# Patient Record
Sex: MALE | Race: WHITE | NOT HISPANIC OR LATINO | Employment: OTHER | URBAN - METROPOLITAN AREA
[De-identification: names, ages, dates, MRNs, and addresses within clinical notes are randomized per-mention and may not be internally consistent; named-entity substitution may affect disease eponyms.]

---

## 2018-08-21 ENCOUNTER — HISTORICAL (OUTPATIENT)
Dept: ADMINISTRATIVE | Facility: HOSPITAL | Age: 58
End: 2018-08-21

## 2018-08-21 LAB
INR PPP: 2.44 (ref 0.9–1.2)
PROTHROMBIN TIME: 25.2 SECOND(S) (ref 11.9–14.4)

## 2018-10-03 ENCOUNTER — HISTORICAL (OUTPATIENT)
Dept: RADIOLOGY | Facility: HOSPITAL | Age: 58
End: 2018-10-03

## 2018-11-20 ENCOUNTER — HISTORICAL (OUTPATIENT)
Dept: CARDIOLOGY | Facility: CLINIC | Age: 58
End: 2018-11-20

## 2018-11-20 LAB
ALBUMIN SERPL-MCNC: 3.6 GM/DL (ref 3.4–5)
ALBUMIN/GLOB SERPL: 1 RATIO (ref 1–2)
ALP SERPL-CCNC: 77 UNIT/L (ref 45–117)
ALT SERPL-CCNC: 42 UNIT/L (ref 12–78)
AST SERPL-CCNC: 19 UNIT/L (ref 15–37)
BILIRUB SERPL-MCNC: 0.4 MG/DL (ref 0.2–1)
BILIRUBIN DIRECT+TOT PNL SERPL-MCNC: 0.1 MG/DL
BILIRUBIN DIRECT+TOT PNL SERPL-MCNC: 0.3 MG/DL
BUN SERPL-MCNC: 10 MG/DL (ref 7–18)
CALCIUM SERPL-MCNC: 8.6 MG/DL (ref 8.5–10.1)
CHLORIDE SERPL-SCNC: 106 MMOL/L (ref 98–107)
CHOLEST SERPL-MCNC: 147 MG/DL
CHOLEST/HDLC SERPL: 5.4 {RATIO} (ref 0–5)
CO2 SERPL-SCNC: 30 MMOL/L (ref 21–32)
CREAT SERPL-MCNC: 0.9 MG/DL (ref 0.6–1.3)
GLOBULIN SER-MCNC: 4.3 GM/ML (ref 2.3–3.5)
GLUCOSE SERPL-MCNC: 217 MG/DL (ref 74–106)
HDLC SERPL-MCNC: 27 MG/DL
LDLC SERPL CALC-MCNC: 62 MG/DL (ref 0–130)
POTASSIUM SERPL-SCNC: 4.4 MMOL/L (ref 3.5–5.1)
PROT SERPL-MCNC: 7.9 GM/DL (ref 6.4–8.2)
SODIUM SERPL-SCNC: 140 MMOL/L (ref 136–145)
TRIGL SERPL-MCNC: 290 MG/DL
VLDLC SERPL CALC-MCNC: 58 MG/DL

## 2019-03-28 ENCOUNTER — HISTORICAL (OUTPATIENT)
Dept: CARDIOLOGY | Facility: CLINIC | Age: 59
End: 2019-03-28

## 2019-03-28 LAB
BUN SERPL-MCNC: 11 MG/DL (ref 7–18)
CALCIUM SERPL-MCNC: 8.9 MG/DL (ref 8.5–10.1)
CHLORIDE SERPL-SCNC: 103 MMOL/L (ref 98–107)
CO2 SERPL-SCNC: 31 MMOL/L (ref 21–32)
CREAT SERPL-MCNC: 0.8 MG/DL (ref 0.6–1.3)
CREAT/UREA NIT SERPL: 14
GLUCOSE SERPL-MCNC: 213 MG/DL (ref 74–106)
POTASSIUM SERPL-SCNC: 4.5 MMOL/L (ref 3.5–5.1)
SODIUM SERPL-SCNC: 139 MMOL/L (ref 136–145)

## 2020-04-28 ENCOUNTER — HISTORICAL (OUTPATIENT)
Dept: ADMINISTRATIVE | Facility: HOSPITAL | Age: 60
End: 2020-04-28

## 2020-04-28 LAB
INR PPP: 1.4 (ref 0.9–1.2)
PROTHROMBIN TIME: 17.1 SECOND(S) (ref 11.9–14.4)

## 2020-06-11 ENCOUNTER — HISTORICAL (OUTPATIENT)
Dept: LAB | Facility: HOSPITAL | Age: 60
End: 2020-06-11

## 2022-02-17 ENCOUNTER — HISTORICAL (OUTPATIENT)
Dept: RADIOLOGY | Facility: HOSPITAL | Age: 62
End: 2022-02-17

## 2022-02-17 ENCOUNTER — HISTORICAL (OUTPATIENT)
Dept: ADMINISTRATIVE | Facility: HOSPITAL | Age: 62
End: 2022-02-17

## 2022-02-17 LAB — CREAT SERPL-MCNC: 0.98 MG/DL (ref 0.73–1.18)

## 2022-04-10 ENCOUNTER — HISTORICAL (OUTPATIENT)
Dept: ADMINISTRATIVE | Facility: HOSPITAL | Age: 62
End: 2022-04-10

## 2022-04-26 VITALS
WEIGHT: 221.81 LBS | DIASTOLIC BLOOD PRESSURE: 82 MMHG | SYSTOLIC BLOOD PRESSURE: 130 MMHG | HEIGHT: 67 IN | OXYGEN SATURATION: 99 % | BODY MASS INDEX: 34.81 KG/M2

## 2022-05-03 NOTE — HISTORICAL [facility] CERNER
This is a historical note converted from Kristina. Formatting and pictures may have been removed.  Please reference Kristina for original formatting and attached multimedia. Chief Complaint  initial visit referred by [facility] for hx of DVT denies chest pain or sob thinks he is here to resume coumadin tx no questions today  History of Present Illness  58-year-old male referred by [facility] for further evaluation of hx of?DVT.? He has a past medical history of diabetes mellitus and hyperlipidemia.? He continues to take Coumadin with?his last INR in May 2018 (per patients report).? He denies chest pain, palpitations, orthopnea, PND, peripheral edema, syncope, or any newly noticed activity limitations.? He reports shortness of breath with exertion. ?He also reports claudication symptoms when ambulating approximately 1 block.? He had?abnormal PATRICK testing in June 2017.? He completed a nuclear stress test in March 2018 which was?a normal perfusion study with no perfusion defects noted.  ?  Nuclear stress test March 2018:  This is a normal perfusion study, no perfusion defects noted.  The scan is suggestive of low risk for future cardiovascular events.  The left ventricular cavity is noted to be normal on the stress study. ?The left ventricular ejection fraction was calculated to be 63% and left ventricular global function is normal.? Next on the study quality is good.  ?  Bilateral carotid ultrasound?March 2018:  The study quality is good.  1-39% stenosis in the proximal right internal carotid artery based on?Bluth criteria.  1-39% stenosis in the proximal left internal carotid artery based on Bluth criteria.  Less than 50% stenosis in the distal bilateral common carotid arteries with mild hyperechoic plaque  Antegrade right vertebral artery flow.? Antegrade left vertebral artery flow.  ?  Location the arterial ultrasound?June 2017:  The study quality is good.  50-75% stenosis detected in the right  distal superficial femoral artery.  Less than 30% stenosis detected in the left mid superficial femoral artery.  Less than 30% stenosis detected in the left popliteal artery.  Right common femoral artery and right superficial femoral artery appear patent with hemodynamically significant stenosis detected.  ?  Review of Systems  Constitutional: negative for fever,chills, sweats, weakness, fatigue, decreased activity?????  Eye: negative  ENMT: negative  Respiratory: GRAY  Cardiovascular: negative  Gastrointestinal: negative?for nausea, vomiting, abdominal pain, constipation, diarrhea  Genitourinary: negative  Hema/Lymph: negative?for bruising/bleeding tendency, negative for swollen lymph glands  Endocrine: negative  Immunologic: negative  Musculoskeletal: claudication  Integumentary: negative  Neurologic: negative  Psychiatric: negative  All Other ROS: negative  Physical Exam  Vitals & Measurements  T:?36.9? ?C (Oral)? HR:?75(Peripheral)? RR:?20? BP:?128/60? SpO2:?97%? WT:?106.4?kg? WT:?106.4?kg?  General: alert and oriented/no acute distress  Eye: EOMI/normal conjunctiva/vision unchanged  HENT: normocephalic/normal hearing/moist oral mucosa  Neck: supple/nontender/no carotid bruit/no JVD  Respiratory: lungs CTA/nonlabored respirations/BS equal/symmetrical expansion/no chest wall tenderness  Cardiovascular: normal rate/normal rhythm/no murmur/no gallop/decreased pulses BLE/normal peripheral perfusion/no edema  Gastrointestinal: soft/nontender/nondistended/normal bowel sounds  Lymphatics: no lymphadenopathy  Musculoskeletal: normal ROM/normal strength  Integumentary: warm/dry/pink/intact/skin changes noted BLE  Neurologic: alert/oriented/normal sensory/no focal deficits  Psychiatric: cooperative/appropriate mood and affect/normal judgment  Assessment/Plan  Hx of DVT  Patient reports 2 DVTs in the 1990s  Currently on Coumadin  Will order INR today - will give Coumadin instructions according to INR result  Will refer to  Coumadin Clinic for regular INR monitoring  ?  GRAY  Will order Echocardiogram to assess EF and valves  Nuclear Stress Test March 2018 - normal perfusion study  ?  Diabetes Mellitus  Management per PCP  HgbA1C in June 2018 - 7.1  ?  Hyperlipidemia  Continue Lipitor 40mg by mouth at bedtime  Will order FLP prior to next visit  ?  Tobacco Use  Counseled on the importance of smoking cessation  States he is currently vaping  ?  PVD  Abnormal PATRICK June 2017  Will order PATRICK testing  Will refer to vascular surgery clinic  Continue Coumadin and Lipitor  ?  INR today  Echocardiogram  Refer to Coumadin clinic  Refer to Internal Medicine Clinic to become established with a PCP  Refer to Vascular Surgery Clinic for evaluation of PVD  Follow up in Cardiology Clinic in 3 months   Problem List/Past Medical History  Ongoing  DM (diabetes mellitus)  Elevated cholesterol with elevated triglycerides  Historical  No qualifying data  Procedure/Surgical History  Colonoscopy and biopsy of colon (01/29/2016)  Colonoscopy, flexible; with removal of tumor(s), polyp(s), or other lesion(s) by hot biopsy forceps (01/29/2016)  Excision of Large Intestine, Via Natural or Artificial Opening Endoscopic, Diagnostic (01/29/2016)  Colonoscopy (01/20/2016)  Colonoscopy, flexible; with removal of tumor(s), polyp(s), or other lesion(s) by hot biopsy forceps (01/20/2016)  Excision of Large Intestine, Via Natural or Artificial Opening Endoscopic, Diagnostic (01/20/2016)  L Lower extremity laceration repair   Medications  Basaglar KwikPen 100 units/mL subcutaneous solution, 60 units, Subcutaneous, Once a day (at bedtime)  Caltrate 600 + D, 1 tab(s), Oral, BID,? ?Not taking  Coumadin 5 mg oral tablet, 5 mg= 1 tab(s), Oral, Daily  glipiZIDE 10 mg oral tablet, 20 mg= 2 tab(s), Oral, Daily  Janumet XR 50 mg-1000 mg oral tablet, extended release, 1 tab(s), Oral, BID,? ?Not taking  Lantus Solostar Pen 100 units/mL subcutaneous solution, 20 units, Subcutaneous,  Daily,? ?Not taking  Lipitor 40 mg oral tablet, 40 mg= 1 tab(s), Oral, Daily  Lyrica 150 mg oral capsule, 150 mg= 1 cap(s), Oral, BID,? ?Not taking  metFORMIN 1000 mg oral tablet, extended release, 1000 mg= 1 tab(s), Oral, BID  NexIUM 40 mg oral delayed release capsule, 40 mg= 1 cap(s), Oral, Daily,? ?Not taking  Pepcid 20 mg oral tablet, 40 mg= 2 tab(s), Oral, Once  Remeron 30 mg oral tablet, 30 mg= 1 tab(s), Oral, Once a day (at bedtime),? ?Not taking  Allergies  penicillins  Social History  Alcohol  Past, Beer, 1-2 times per week, Started age 14 Years. Stopped age 36 Years. Alcohol use interferes with work or home: No. Drinks more than intended: No. Others hurt by drinking: No. Ready to change: No., 11/18/2015  Employment/School  Employed, Work/School description: CONSTRUCTION. Highest education level: None., 11/18/2015  Home/Environment  Lives with Children, Spouse. Living situation: Home/Independent. Home equipment: Glucose monitoring, Monitoring. Alcohol abuse in household: No. Substance abuse in household: No. Smoker in household: Yes. Injuries/Abuse/Neglect in household: No. Feels unsafe at home: No. Safe place to go: Yes. Family/Friends available for support: Yes., 11/18/2015  Nutrition/Health  Diabetic, 11/18/2015  Substance Abuse  Never, Previous treatment: None., 11/18/2015  Tobacco  Current every day smoker Use:. Cigarettes Type:. 10 per day. Started age 9 Years., 08/21/2018  Family History  Acute myocardial infarction.: Father.      I was present with the nurse practitioner during the history and exam.  ? ?  [x? ] I discussed the case with the nurse practitioner and agree with the findings and plan as documented in the nurse practitioners note.  [ ] I discussed the case with the nurse practitioner and agree with the findings and plan as documented in the nurse practitioners note except: [ ]  ?   Diagnosis:  AAA  Will order an abdominal US to monitor AAA   INR 2.44 - patient instructed to continue  current dose of Coumadin - INR recheck in 1 month  Awaiting appt in Coumadin clinic

## 2022-05-03 NOTE — HISTORICAL [facility] CERNER
This is a historical note converted from Kristina. Formatting and pictures may have been removed.  Please reference Kristina for original formatting and attached multimedia. Chief Complaint  1 year followup. Labs not complete. C/O ongoing leg pain. Has not taken coumadin for 2 days, out of medication and could not reach Zeina in coumadin cliic. Was supposed to have an appointment today, however it has been cancelled.  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 He has been doing well and denies any chest pains and SOB. He has chronic persistent LLE pain unrelated to exertion for about 40 years since his DVT.??  He completed a nuclear stress test in March 2018 which was?a normal perfusion study with no perfusion defects noted.?  He smokes 6 cig/day and is not ready to quit  ?He is?compliant with?his medication. He is out of coumadin for the past 2 days and follows in coumadin clinic.?  ?  US LE Arterial Stress (10/3/18)  Brachial systolic pressure R: 159, L:162  PATRICK: R:1.18, L:1.17  Normal resting PATRICK  ?   TTE( 10/3/18)  LVEF 69.2%  LV Diastolic Function shows impaired relaxation  Mitral Annular Calcification  ?  Review of Systems  Constitutional:?no fever, fatigue, weakness  Respiratory:?no cough, no wheezing, no shortness of breath  Cardiovascular:?no chest pain, no palpitations, occasional LE edema that improves with leg elevation  Gastrointestinal:?no nausea, vomiting, or diarrhea. No abdominal pain  Hema/Lymph:?no abnormal bruising or bleeding  Endocrine:?no heat or cold intolerance, no excessive thirst or excessive urination  Musculoskeletal:?no muscle or joint pain, no joint swelling  Integumentary: dry skin  Neurologic: no headache, no dizziness, no weakness or numbness  Physical Exam  Vitals & Measurements  T:?36.7? ?C (Oral)? HR:?88(Peripheral)? RR:?18? BP:?130/82? SpO2:?99%? WT:?100.6?kg? BMI:?34.81?  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 He has been doing well and denies any chest pains and SOB. He has chronic persistent LLE pain unrelated to exertion for about 40 years since his DVT.?This pain is not due to PVD and he had normal US arterial of Le in 2018. He does not take medicine for it and does not want any further imaging/ testing for it.  ?   He is euvolemic and doing well.  He has been out of coumadin for 2 days  Will refill it. BP at goal.  He follows in coumadin clinic. Will contact?the clinic?and expedite his appointment for blood work and follow up.  Counseled on smoking cessation. He is not ready for it and does not want any assistance.  Last lab work was in 2018 with LDL of 62  will repeat lipid panel and BMP  ?   Follow up in 6 months.   Problem List/Past Medical History  Ongoing  DM (diabetes mellitus)  Elevated cholesterol with elevated triglycerides  Historical  No qualifying data  Procedure/Surgical History  Colonoscopy and biopsy of colon (01/29/2016)  Colonoscopy, flexible; with removal of tumor(s), polyp(s), or other lesion(s) by hot biopsy forceps (01/29/2016)  Excision of Large Intestine, Via Natural or Artificial Opening Endoscopic, Diagnostic (01/29/2016)  Colonoscopy (01/20/2016)  Colonoscopy, flexible; with removal of tumor(s), polyp(s), or other lesion(s) by hot biopsy forceps (01/20/2016)  Excision of Large Intestine, Via Natural or Artificial Opening Endoscopic,  Diagnostic (01/20/2016)  L Lower extremity laceration repair   Medications  Inpatient  No active inpatient medications  Home  Basaglar KwikPen 100 units/mL subcutaneous solution, 60 units, Subcutaneous, Once a day (at bedtime),? ?Not taking  Coumadin 5 mg oral tablet, 5 mg= 1 tab(s), Oral, Daily, 3 refills  Coumadin 7.5 mg oral tablet, 7.5 mg= 1 tab(s), Oral  fenofibrate 48 mg oral tablet, 1 cap(s), Oral, Daily, 6 refills,? ?Not taking  Janumet XR 50 mg-1000 mg oral tablet, extended release, 1 tab(s), Oral, BID,? ?Not taking  Lantus Solostar Pen 100 units/mL subcutaneous solution, 20 units, Subcutaneous, Daily,? ?Not taking  lisinopril 5 mg oral tablet, 5 mg= 1 tab(s), Oral, Daily, 10 refills,? ?Not taking  lovastatin 40 mg oral tablet, 80 mg= 2 tab(s), Oral, Daily  Lyrica 150 mg oral capsule, 150 mg= 1 cap(s), Oral, BID,? ?Not taking  metFORMIN 1000 mg oral tablet, extended release, 1000 mg= 1 tab(s), Oral, BID  Pepcid 20 mg oral tablet, 40 mg= 2 tab(s), Oral, Once  Remeron 30 mg oral tablet, 30 mg= 1 tab(s), Oral, Once a day (at bedtime),? ?Not taking  warfarin 5 mg oral tablet, See Instructions,? ?Not taking  warfarin 5 mg oral tablet, See Instructions, 1 refills,? ?Not taking  warfarin 5 mg oral tablet, See Instructions,? ?Not taking  Zetia 10 mg oral tablet, 10 mg= 1 tab(s), Oral, Daily  Allergies  penicillins  Social History  Abuse/Neglect  No, 04/28/2020  Alcohol  Past, Beer, 1-2 times per week, Started age 14 Years. Stopped age 36 Years. Alcohol use interferes with work or home: No. Drinks more than intended: No. Others hurt by drinking: No. Ready to change: No., 11/18/2015  Employment/School  Employed, Work/School description: CONSTRUCTION. Highest education level: None., 11/18/2015  Home/Environment  Lives with Children, Spouse. Living situation: Home/Independent. Home equipment: Glucose monitoring, Monitoring. Alcohol abuse in household: No. Substance abuse in household: No. Smoker in household: Yes.  Injuries/Abuse/Neglect in household: No. Feels unsafe at home: No. Safe place to go: Yes. Family/Friends available for support: Yes., 11/18/2015  Nutrition/Health  Diabetic, 11/18/2015  Sexual  Gender Identity Identifies as male., 03/28/2019  Substance Use  Never, Previous treatment: None., 11/18/2015  Tobacco  10 or more cigarettes (1/2 pack or more)/day in last 30 days, No, 04/28/2020  Family History  Acute myocardial infarction.: Father.  Mother: History is unknown      Mr. Mathew is 59 years old and has history of recurrent DVTs.? Patient is on lifelong Coumadin.? Patient denies any chest pain or shortness of breath.? Continues to smoke, but was counseled to quitting.  ?   The patient was seen and evaluated by the cardiology fellow.? The patient was discussed with the fellow. ?Please see the?above?note for further details.??  ?

## 2022-10-24 DIAGNOSIS — R10.9 ABDOMINAL PAIN, UNSPECIFIED ABDOMINAL LOCATION: Primary | ICD-10-CM

## 2022-10-24 DIAGNOSIS — R11.0 NAUSEA: ICD-10-CM

## 2023-01-19 DIAGNOSIS — I71.40 ABDOMINAL AORTIC ANEURYSM: Primary | ICD-10-CM
